# Patient Record
Sex: FEMALE | Race: BLACK OR AFRICAN AMERICAN | NOT HISPANIC OR LATINO | URBAN - METROPOLITAN AREA
[De-identification: names, ages, dates, MRNs, and addresses within clinical notes are randomized per-mention and may not be internally consistent; named-entity substitution may affect disease eponyms.]

---

## 2017-03-01 ENCOUNTER — OUTPATIENT (OUTPATIENT)
Dept: OUTPATIENT SERVICES | Facility: HOSPITAL | Age: 22
LOS: 1 days | End: 2017-03-01
Payer: COMMERCIAL

## 2017-03-01 VITALS
WEIGHT: 130.07 LBS | OXYGEN SATURATION: 98 % | HEIGHT: 63 IN | HEART RATE: 60 BPM | SYSTOLIC BLOOD PRESSURE: 100 MMHG | RESPIRATION RATE: 16 BRPM | TEMPERATURE: 98 F | DIASTOLIC BLOOD PRESSURE: 60 MMHG

## 2017-03-01 DIAGNOSIS — K42.9 UMBILICAL HERNIA WITHOUT OBSTRUCTION OR GANGRENE: ICD-10-CM

## 2017-03-01 DIAGNOSIS — Z01.818 ENCOUNTER FOR OTHER PREPROCEDURAL EXAMINATION: ICD-10-CM

## 2017-03-01 LAB
HCT VFR BLD CALC: 31.3 % — LOW (ref 34.5–45)
HGB BLD-MCNC: 9.3 G/DL — LOW (ref 11.5–15.5)
MCHC RBC-ENTMCNC: 19.4 PG — LOW (ref 27–34)
MCHC RBC-ENTMCNC: 29.8 GM/DL — LOW (ref 32–36)
MCV RBC AUTO: 65.2 FL — LOW (ref 80–100)
PLATELET # BLD AUTO: 453 K/UL — HIGH (ref 150–400)
RBC # BLD: 4.8 M/UL — SIGNIFICANT CHANGE UP (ref 3.8–5.2)
RBC # FLD: 18.1 % — HIGH (ref 10.3–14.5)
WBC # BLD: 7.1 K/UL — SIGNIFICANT CHANGE UP (ref 3.8–10.5)
WBC # FLD AUTO: 7.1 K/UL — SIGNIFICANT CHANGE UP (ref 3.8–10.5)

## 2017-03-01 PROCEDURE — 86850 RBC ANTIBODY SCREEN: CPT

## 2017-03-01 PROCEDURE — 86901 BLOOD TYPING SEROLOGIC RH(D): CPT

## 2017-03-01 PROCEDURE — 86900 BLOOD TYPING SEROLOGIC ABO: CPT

## 2017-03-01 PROCEDURE — G0463: CPT

## 2017-03-01 PROCEDURE — 85027 COMPLETE CBC AUTOMATED: CPT

## 2017-03-01 NOTE — H&P PST ADULT - PMH
Iron deficiency anemia    Seasonal allergies    Sickle cell trait    Umbilical hernia without obstruction and without gangrene

## 2017-03-01 NOTE — H&P PST ADULT - NSANTHOSAYNRD_GEN_A_CORE
No. FAY screening performed.  STOP BANG Legend: 0-2 = LOW Risk; 3-4 = INTERMEDIATE Risk; 5-8 = HIGH Risk

## 2017-03-01 NOTE — H&P PST ADULT - PROBLEM SELECTOR PLAN 1
Patient is scheduled for repair of umbilical hernia on 3/8/17.Patient is at moderate risk for this surgery.

## 2017-03-01 NOTE — H&P PST ADULT - LAB RESULTS AND INTERPRETATION
H and H 9.3/31/3, pt had LMP 2/23/17/up to today, heavy , long and started Rerrous Sulfate 325mg PO 3 times x day 2 days ago, type and screen sent cbc done 9.3/31.3, called PCP Dr. Deluca [phone] from [facility] , left message with her nurse and faxed lab results so PCP can update/revise medical clearance, pt instructed to continue to take Iron , I will speak to patient PCP tomorrow 03/2/17 - will repeat CBC in AM of sx

## 2017-03-01 NOTE — H&P PST ADULT - HISTORY OF PRESENT ILLNESS
21 years old female presented with belly button swelling and protruding she cold reduce x long time, pt has scar tissue after belly button piercing, slight TTP. Diagnosed with umbilical hernia without obstruction or gangrene and is scheduled for repair of umbilical hernia on 3/8/17.

## 2017-03-01 NOTE — H&P PST ADULT - PRIMARY CARE PROVIDER
Anastasia Flanagan MD [facility] [phone] Dr. Yosi MD [facility] [phone]

## 2017-03-01 NOTE — H&P PST ADULT - NEGATIVE GENERAL GENITOURINARY SYMPTOMS
normal urinary frequency/no flank pain R/no flank pain L/no renal colic/no hematuria/no dysuria/no incontinence/no urinary hesitancy

## 2017-03-08 ENCOUNTER — OUTPATIENT (OUTPATIENT)
Dept: OUTPATIENT SERVICES | Facility: HOSPITAL | Age: 22
LOS: 1 days | Discharge: HOME | End: 2017-03-08
Payer: COMMERCIAL

## 2017-03-08 VITALS — HEART RATE: 62 BPM | DIASTOLIC BLOOD PRESSURE: 69 MMHG | RESPIRATION RATE: 17 BRPM | SYSTOLIC BLOOD PRESSURE: 101 MMHG

## 2017-03-08 VITALS
RESPIRATION RATE: 14 BRPM | OXYGEN SATURATION: 100 % | WEIGHT: 130.07 LBS | SYSTOLIC BLOOD PRESSURE: 100 MMHG | HEART RATE: 68 BPM | DIASTOLIC BLOOD PRESSURE: 48 MMHG | HEIGHT: 63 IN | TEMPERATURE: 98 F

## 2017-03-08 DIAGNOSIS — Z01.818 ENCOUNTER FOR OTHER PREPROCEDURAL EXAMINATION: ICD-10-CM

## 2017-03-08 DIAGNOSIS — K42.9 UMBILICAL HERNIA WITHOUT OBSTRUCTION OR GANGRENE: ICD-10-CM

## 2017-03-08 LAB
HCG UR QL: NEGATIVE — SIGNIFICANT CHANGE UP
HCT VFR BLD CALC: 31.7 % — LOW (ref 34.5–45)
HGB BLD-MCNC: 10 G/DL — LOW (ref 11.5–15.5)
MCHC RBC-ENTMCNC: 20.8 PG — LOW (ref 27–34)
MCHC RBC-ENTMCNC: 31.5 GM/DL — LOW (ref 32–36)
MCV RBC AUTO: 66 FL — LOW (ref 80–100)
PLATELET # BLD AUTO: 356 K/UL — SIGNIFICANT CHANGE UP (ref 150–400)
RBC # BLD: 4.8 M/UL — SIGNIFICANT CHANGE UP (ref 3.8–5.2)
RBC # FLD: 20.2 % — HIGH (ref 10.3–14.5)
WBC # BLD: 3.6 K/UL — LOW (ref 3.8–10.5)
WBC # FLD AUTO: 3.6 K/UL — LOW (ref 3.8–10.5)

## 2017-03-08 PROCEDURE — 88302 TISSUE EXAM BY PATHOLOGIST: CPT | Mod: 26

## 2017-03-08 PROCEDURE — 86850 RBC ANTIBODY SCREEN: CPT

## 2017-03-08 PROCEDURE — 88302 TISSUE EXAM BY PATHOLOGIST: CPT

## 2017-03-08 PROCEDURE — 49585: CPT | Mod: AS

## 2017-03-08 PROCEDURE — 85027 COMPLETE CBC AUTOMATED: CPT

## 2017-03-08 PROCEDURE — 86900 BLOOD TYPING SEROLOGIC ABO: CPT

## 2017-03-08 PROCEDURE — 86901 BLOOD TYPING SEROLOGIC RH(D): CPT

## 2017-03-08 PROCEDURE — 49585: CPT

## 2017-03-08 PROCEDURE — 81025 URINE PREGNANCY TEST: CPT

## 2017-03-08 RX ORDER — ACETAMINOPHEN 500 MG
1000 TABLET ORAL ONCE
Qty: 0 | Refills: 0 | Status: DISCONTINUED | OUTPATIENT
Start: 2017-03-08 | End: 2017-03-16

## 2017-03-08 RX ORDER — FERROUS SULFATE 325(65) MG
1 TABLET ORAL
Qty: 0 | Refills: 0 | COMMUNITY

## 2017-03-08 RX ORDER — SODIUM CHLORIDE 9 MG/ML
3 INJECTION INTRAMUSCULAR; INTRAVENOUS; SUBCUTANEOUS EVERY 8 HOURS
Qty: 0 | Refills: 0 | Status: DISCONTINUED | OUTPATIENT
Start: 2017-03-08 | End: 2017-03-08

## 2017-03-08 RX ORDER — ACETAMINOPHEN WITH CODEINE 300MG-30MG
1 TABLET ORAL
Qty: 20 | Refills: 0 | OUTPATIENT
Start: 2017-03-08 | End: 2017-03-13

## 2017-03-08 RX ORDER — FENTANYL CITRATE 50 UG/ML
25 INJECTION INTRAVENOUS
Qty: 0 | Refills: 0 | Status: DISCONTINUED | OUTPATIENT
Start: 2017-03-08 | End: 2017-03-08

## 2017-03-08 RX ORDER — SODIUM CHLORIDE 9 MG/ML
1000 INJECTION, SOLUTION INTRAVENOUS
Qty: 0 | Refills: 0 | Status: DISCONTINUED | OUTPATIENT
Start: 2017-03-08 | End: 2017-03-08

## 2017-03-08 RX ORDER — ONDANSETRON 8 MG/1
4 TABLET, FILM COATED ORAL ONCE
Qty: 0 | Refills: 0 | Status: DISCONTINUED | OUTPATIENT
Start: 2017-03-08 | End: 2017-03-08

## 2017-03-08 RX ADMIN — FENTANYL CITRATE 25 MICROGRAM(S): 50 INJECTION INTRAVENOUS at 14:06

## 2017-03-08 RX ADMIN — SODIUM CHLORIDE 130 MILLILITER(S): 9 INJECTION, SOLUTION INTRAVENOUS at 14:17

## 2017-03-08 RX ADMIN — FENTANYL CITRATE 25 MICROGRAM(S): 50 INJECTION INTRAVENOUS at 13:54

## 2017-03-08 RX ADMIN — SODIUM CHLORIDE 3 MILLILITER(S): 9 INJECTION INTRAMUSCULAR; INTRAVENOUS; SUBCUTANEOUS at 09:01

## 2017-03-08 NOTE — ASU DISCHARGE PLAN (ADULT/PEDIATRIC). - SPECIAL INSTRUCTIONS
no heavy lifting or strenuous activity ( nothing more than 30 lbs) for 6 -8 weeks. First two weeks no lifting more than 10lbs

## 2017-03-08 NOTE — ASU DISCHARGE PLAN (ADULT/PEDIATRIC). - MEDICATION SUMMARY - MEDICATIONS TO TAKE
I will START or STAY ON the medications listed below when I get home from the hospital:    acetaminophen-codeine 300 mg-15 mg oral tablet  -- 1 tab(s) by mouth every 6 hours, As Needed -for severe pain MDD:4  -- Caution federal law prohibits the transfer of this drug to any person other  than the person for whom it was prescribed.  May cause drowsiness.  Alcohol may intensify this effect.  Use care when operating dangerous machinery.  This product contains acetaminophen.  Do not use  with any other product containing acetaminophen to prevent possible liver damage.  Using more of this medication than prescribed may cause serious breathing problems.    -- Indication: For pain meds    ferrous sulfate 325 mg (65 mg elemental iron) oral tablet  -- 1 tab(s) by mouth 3 times a day  -- Indication: For as directed

## 2017-03-08 NOTE — ASU DISCHARGE PLAN (ADULT/PEDIATRIC). - NOTIFY
Numbness, color, or temperature change to extremity/Swelling that continues/Bleeding that does not stop Numbness, color, or temperature change to extremity/Fever greater than 101/Bleeding that does not stop/Swelling that continues/Pain not relieved by Medications

## 2017-03-10 DIAGNOSIS — J30.2 OTHER SEASONAL ALLERGIC RHINITIS: ICD-10-CM

## 2017-03-10 DIAGNOSIS — D57.3 SICKLE-CELL TRAIT: ICD-10-CM

## 2017-03-10 DIAGNOSIS — K42.9 UMBILICAL HERNIA WITHOUT OBSTRUCTION OR GANGRENE: ICD-10-CM

## 2017-03-10 DIAGNOSIS — Z82.49 FAMILY HISTORY OF ISCHEMIC HEART DISEASE AND OTHER DISEASES OF THE CIRCULATORY SYSTEM: ICD-10-CM

## 2017-03-10 DIAGNOSIS — D50.9 IRON DEFICIENCY ANEMIA, UNSPECIFIED: ICD-10-CM

## 2017-03-14 LAB — SURGICAL PATHOLOGY FINAL REPORT - CH: SIGNIFICANT CHANGE UP

## 2019-12-17 NOTE — H&P PST ADULT - WEIGHT IN KG
Detail Level: Simple
59

## 2024-02-20 NOTE — H&P PST ADULT - HEMATOLOGY/LYMPHATICS
Immunization(s) given during visit:    Immunizations Administered       Name Date Dose Route    DTaP-IPV, QUADRACEL, KINRIX, (age 4y-6y), IM, 0.5mL 2/20/2024 0.5 mL Intramuscular    Site: Deltoid- Right    Lot:     NDC: 44112-945-45    MMR-Varicella, PROQUAD, (age 12m -12y), SC, 0.5mL 2/20/2024 0.5 mL Subcutaneous    Site: Left arm    Lot: [id number]    NDC: 4439-4261-50            Most recent Vaccine Information Sheet dated 08/06/21, 2020 given to pt   details…

## 2024-11-29 NOTE — H&P PST ADULT - ACTIVITY
Psychiatric Follow Up Progress Note    Patient: Suraj Zapien Date: 2024   : 1942 Attending: No att. providers found   82 year old male      This visit was conducted via two-way video. This patient verbally consents to a video visit. Patient identifies as Suraj and reports he is currently located at home.     The video visit was being conducted for the purpose of providing treatment advice. The treatment advice that was being provided was based on what was reported by the patient. Without the patient being seen and evaluated in person, there would be some risk that the information and/or assessment provided by the [facility] provider might be incomplete or inaccurate.  Patient's wife and son present during session.    Prep-Time, Appointment Duration, and Post-appointment documentation time: 20 minutes    Chief complaint: \"Better.\"     Interval History: Suraj is a 82 year old White  male with diagnoses of DENNIS, Major neurocognitive disorder, and insomnia who presents today reporting symptoms of gradually improving energy and concentration. Patient is currently on Effexor  mg mornings and 37.5 mg evenings, Mirtazapine 7.5 mg nightly. Reports that he is tolerating medications without untoward effects. He states he has a little more interest in things again. He is talking more clearly today and providing more information. His wife agrees that he is doing better.   Patient would like to talk with a therapist.     ROS:Denies chest pain or SOB.     EXAM:              Vitals: There were no vitals taken for this visit.               Mental Status Exam:  Appearance: Patient appears stated age, friendly, clean, well groomed, good eye contact. Behavior: Slow, but well-coordinated, no tics or tremors.  Speech: slow, soft; no slurring or stuttering.  Orientation: Person, Place, and Time.  Mood: Appropriate to situation.  Thought Process: Coherent, logical, able to recall both past and present history clearly.  Thought  Content: Consistent with reality, no obsessions, no delusions, no pressured speech, no ideas of reference, no hallucinations.  Cognition: appropriate to age.  Patient demonstrated good insight judgment. Risk Assessment:  Low. Patient denies thoughts of suicide or self harm behavior.    Recent EKG:  No results found for this or any previous visit.     Recent Lab study results:    CBC with differential  Lab Results   Component Value Date    WBC 6.7 06/07/2022    WBC 5.2 03/05/2019    RBC 4.49 (L) 06/07/2022    RBC 4.34 (L) 03/05/2019    HGB 15.0 06/07/2022    HGB 15.0 03/05/2019    HCT 44.4 06/07/2022    HCT 45.2 03/05/2019     06/07/2022     03/05/2019     05/10/2012    SEG 70 07/08/2021    SEG 65 03/05/2019    SEG 68 05/10/2012    PMON 8 07/08/2021    PMON 9 03/05/2019    PEOS 0 07/08/2021    PEOS 0 03/05/2019    PBASO 1 07/08/2021    PBASO 1 03/05/2019    PBASO 1 05/10/2012    ANEUT 3.7 07/08/2021    ANEUT 3.4 03/05/2019    ANEUT 3.3 05/10/2012    ALYMS 1.1 07/08/2021    ALYMS 1.3 03/05/2019    HERMINIO 0.4 07/08/2021    HERMINIO 0.5 03/05/2019    AEOS 0.0 07/08/2021    AEOS 0.0 (L) 03/05/2019    AEOS 0.0 05/10/2012    ABASO 0.0 07/08/2021    ABASO 0.0 03/05/2019    ABASO 0.0 05/10/2012       Comprehensive metabolic panel  Lab Results   Component Value Date    SODIUM 138 05/09/2024    POTASSIUM 4.7 05/09/2024    CHLORIDE 109 05/09/2024    CO2 22 05/09/2024    GLUCOSE 122 (H) 05/09/2024    BUN 13 05/09/2024    CREATININE 0.96 05/09/2024    CALCIUM 9.3 05/09/2024    ALBUMIN 4.0 05/09/2024    BILIRUBIN 0.6 05/09/2024    ALKPT 57 05/09/2024    AST 13 05/09/2024    GPT 23 05/09/2024     No results for input(s): \"GGTP\" in the last 72 hours.  No results found for: \"MG\"  No results found for this or any previous visit.    Thyroid function tests  No results for input(s): \"TSH\", \"T4FREE\", \"T3FREE\" in the last 72 hours.    CPK (Units/L)   Date Value   01/19/2015 131   12/23/2014 76     Hemoglobin A1C (%)   Date  Value   05/09/2024 6.2 (H)       LIPID PANEL  Cholesterol (mg/dL)   Date Value   05/09/2024 158      HDL (mg/dL)   Date Value   05/09/2024 69      Cholesterol/ HDL Ratio (no units)   Date Value   05/09/2024 2.3      Triglycerides (mg/dL)   Date Value   05/09/2024 77      LDL (mg/dL)   Date Value   05/09/2024 74     No results found for this or any previous visit.     Assessment: Generalized anxiety disorder  (primary encounter diagnosis)  Major neurocognitive disorder, due to frontotemporal lobar degeneration, with behavioral disturbance, moderate  (CMD)  Insomnia due to mental disorder    Medical Decision Making/Plan of Treatment:   Patient presenting today with gradually improving symptoms of anxiety; controlled symptoms of insomnia; no further cognitive decline-actually slightly improved.  We discussed treatment options and agreed to change timing of Effexor XR 37.5 mg to daily around noon; continue Effexor  mg mornings, Mirtazapine 7.5 mg nightly.  Patient provided with 10 minutes of: solution-focus brief therapy and motivational interviewing.  Education provided on purpose, timing, possible side effects, risks and benefits for the above medications, as well as, sleep hygiene, diet, exercise and activity to promote mood.  Follow up in 2 months.  Orders Placed This Encounter    venlafaxine XR (EFFEXOR XR) 37.5 MG 24 hr capsule     Sig: Take one capsule daily around noon     Dispense:  30 capsule     Refill:  5     Just changing directions (Has two different doses)       This information is confidential and disclosure without patient consent or statutory authorization is prohibited by law.    ROSARIO Amezquita     walking, ADLs